# Patient Record
Sex: MALE | Race: WHITE | NOT HISPANIC OR LATINO | Employment: OTHER | URBAN - METROPOLITAN AREA
[De-identification: names, ages, dates, MRNs, and addresses within clinical notes are randomized per-mention and may not be internally consistent; named-entity substitution may affect disease eponyms.]

---

## 2024-05-03 ENCOUNTER — APPOINTMENT (OUTPATIENT)
Dept: RADIOLOGY | Facility: MEDICAL CENTER | Age: 69
End: 2024-05-03
Attending: STUDENT IN AN ORGANIZED HEALTH CARE EDUCATION/TRAINING PROGRAM
Payer: MEDICARE

## 2024-05-03 ENCOUNTER — HOSPITAL ENCOUNTER (OUTPATIENT)
Facility: MEDICAL CENTER | Age: 69
End: 2024-05-04
Attending: STUDENT IN AN ORGANIZED HEALTH CARE EDUCATION/TRAINING PROGRAM | Admitting: STUDENT IN AN ORGANIZED HEALTH CARE EDUCATION/TRAINING PROGRAM
Payer: MEDICARE

## 2024-05-03 DIAGNOSIS — R56.9 SEIZURES (HCC): ICD-10-CM

## 2024-05-03 DIAGNOSIS — R55 SYNCOPE, UNSPECIFIED SYNCOPE TYPE: ICD-10-CM

## 2024-05-03 PROBLEM — E78.1 HYPERTRIGLYCERIDEMIA: Status: ACTIVE | Noted: 2024-05-03

## 2024-05-03 PROBLEM — S01.91XA LACERATION OF HEAD: Status: ACTIVE | Noted: 2024-05-03

## 2024-05-03 PROBLEM — C44.91 BASAL CELL CARCINOMA (BCC): Status: ACTIVE | Noted: 2021-07-06

## 2024-05-03 PROBLEM — E03.9 HYPOTHYROIDISM: Status: ACTIVE | Noted: 2024-05-03

## 2024-05-03 LAB
ALBUMIN SERPL BCP-MCNC: 4.4 G/DL (ref 3.2–4.9)
ALBUMIN/GLOB SERPL: 1.8 G/DL
ALP SERPL-CCNC: 70 U/L (ref 30–99)
ALT SERPL-CCNC: 25 U/L (ref 2–50)
AMPHET UR QL SCN: NEGATIVE
ANION GAP SERPL CALC-SCNC: 13 MMOL/L (ref 7–16)
APPEARANCE UR: CLEAR
AST SERPL-CCNC: 31 U/L (ref 12–45)
BACTERIA #/AREA URNS HPF: NEGATIVE /HPF
BARBITURATES UR QL SCN: NEGATIVE
BASOPHILS # BLD AUTO: 0.3 % (ref 0–1.8)
BASOPHILS # BLD: 0.02 K/UL (ref 0–0.12)
BENZODIAZ UR QL SCN: NEGATIVE
BILIRUB SERPL-MCNC: 0.5 MG/DL (ref 0.1–1.5)
BILIRUB UR QL STRIP.AUTO: NEGATIVE
BUN SERPL-MCNC: 16 MG/DL (ref 8–22)
BZE UR QL SCN: NEGATIVE
CALCIUM ALBUM COR SERPL-MCNC: 8.6 MG/DL (ref 8.5–10.5)
CALCIUM SERPL-MCNC: 8.9 MG/DL (ref 8.5–10.5)
CANNABINOIDS UR QL SCN: NEGATIVE
CHLORIDE SERPL-SCNC: 100 MMOL/L (ref 96–112)
CO2 SERPL-SCNC: 23 MMOL/L (ref 20–33)
COLOR UR: YELLOW
CREAT SERPL-MCNC: 0.79 MG/DL (ref 0.5–1.4)
EKG IMPRESSION: NORMAL
EOSINOPHIL # BLD AUTO: 0.07 K/UL (ref 0–0.51)
EOSINOPHIL NFR BLD: 1.2 % (ref 0–6.9)
EPI CELLS #/AREA URNS HPF: NEGATIVE /HPF
ERYTHROCYTE [DISTWIDTH] IN BLOOD BY AUTOMATED COUNT: 39.5 FL (ref 35.9–50)
FENTANYL UR QL: NEGATIVE
GFR SERPLBLD CREATININE-BSD FMLA CKD-EPI: 96 ML/MIN/1.73 M 2
GLOBULIN SER CALC-MCNC: 2.5 G/DL (ref 1.9–3.5)
GLUCOSE SERPL-MCNC: 108 MG/DL (ref 65–99)
GLUCOSE UR STRIP.AUTO-MCNC: NEGATIVE MG/DL
HCT VFR BLD AUTO: 39.1 % (ref 42–52)
HGB BLD-MCNC: 13.8 G/DL (ref 14–18)
HYALINE CASTS #/AREA URNS LPF: ABNORMAL /LPF
IMM GRANULOCYTES # BLD AUTO: 0.03 K/UL (ref 0–0.11)
IMM GRANULOCYTES NFR BLD AUTO: 0.5 % (ref 0–0.9)
KETONES UR STRIP.AUTO-MCNC: 15 MG/DL
LEUKOCYTE ESTERASE UR QL STRIP.AUTO: NEGATIVE
LYMPHOCYTES # BLD AUTO: 0.92 K/UL (ref 1–4.8)
LYMPHOCYTES NFR BLD: 15.9 % (ref 22–41)
MAGNESIUM SERPL-MCNC: 1.9 MG/DL (ref 1.5–2.5)
MCH RBC QN AUTO: 30.7 PG (ref 27–33)
MCHC RBC AUTO-ENTMCNC: 35.3 G/DL (ref 32.3–36.5)
MCV RBC AUTO: 86.9 FL (ref 81.4–97.8)
METHADONE UR QL SCN: NEGATIVE
MICRO URNS: ABNORMAL
MONOCYTES # BLD AUTO: 0.48 K/UL (ref 0–0.85)
MONOCYTES NFR BLD AUTO: 8.3 % (ref 0–13.4)
NEUTROPHILS # BLD AUTO: 4.26 K/UL (ref 1.82–7.42)
NEUTROPHILS NFR BLD: 73.8 % (ref 44–72)
NITRITE UR QL STRIP.AUTO: NEGATIVE
NRBC # BLD AUTO: 0 K/UL
NRBC BLD-RTO: 0 /100 WBC (ref 0–0.2)
OPIATES UR QL SCN: NEGATIVE
OXYCODONE UR QL SCN: NEGATIVE
PCP UR QL SCN: NEGATIVE
PH UR STRIP.AUTO: 7 [PH] (ref 5–8)
PLATELET # BLD AUTO: 196 K/UL (ref 164–446)
PMV BLD AUTO: 9.7 FL (ref 9–12.9)
POTASSIUM SERPL-SCNC: 3.7 MMOL/L (ref 3.6–5.5)
PROPOXYPH UR QL SCN: NEGATIVE
PROT SERPL-MCNC: 6.9 G/DL (ref 6–8.2)
PROT UR QL STRIP: NEGATIVE MG/DL
RBC # BLD AUTO: 4.5 M/UL (ref 4.7–6.1)
RBC # URNS HPF: ABNORMAL /HPF
RBC UR QL AUTO: ABNORMAL
SODIUM SERPL-SCNC: 136 MMOL/L (ref 135–145)
SP GR UR STRIP.AUTO: 1.01
TROPONIN T SERPL-MCNC: 34 NG/L (ref 6–19)
TSH SERPL DL<=0.005 MIU/L-ACNC: 0.52 UIU/ML (ref 0.38–5.33)
UROBILINOGEN UR STRIP.AUTO-MCNC: 0.2 MG/DL
WBC # BLD AUTO: 5.8 K/UL (ref 4.8–10.8)
WBC #/AREA URNS HPF: ABNORMAL /HPF

## 2024-05-03 PROCEDURE — 93005 ELECTROCARDIOGRAM TRACING: CPT | Performed by: STUDENT IN AN ORGANIZED HEALTH CARE EDUCATION/TRAINING PROGRAM

## 2024-05-03 PROCEDURE — 99223 1ST HOSP IP/OBS HIGH 75: CPT | Performed by: STUDENT IN AN ORGANIZED HEALTH CARE EDUCATION/TRAINING PROGRAM

## 2024-05-03 PROCEDURE — 94660 CPAP INITIATION&MGMT: CPT

## 2024-05-03 PROCEDURE — 85025 COMPLETE CBC W/AUTO DIFF WBC: CPT

## 2024-05-03 PROCEDURE — 72125 CT NECK SPINE W/O DYE: CPT

## 2024-05-03 PROCEDURE — 83735 ASSAY OF MAGNESIUM: CPT

## 2024-05-03 PROCEDURE — 700102 HCHG RX REV CODE 250 W/ 637 OVERRIDE(OP)

## 2024-05-03 PROCEDURE — G0378 HOSPITAL OBSERVATION PER HR: HCPCS

## 2024-05-03 PROCEDURE — 303747 HCHG EXTRA SUTURE

## 2024-05-03 PROCEDURE — 84484 ASSAY OF TROPONIN QUANT: CPT

## 2024-05-03 PROCEDURE — 70486 CT MAXILLOFACIAL W/O DYE: CPT

## 2024-05-03 PROCEDURE — A9270 NON-COVERED ITEM OR SERVICE: HCPCS

## 2024-05-03 PROCEDURE — 81001 URINALYSIS AUTO W/SCOPE: CPT | Mod: XU

## 2024-05-03 PROCEDURE — 80053 COMPREHEN METABOLIC PANEL: CPT

## 2024-05-03 PROCEDURE — 84443 ASSAY THYROID STIM HORMONE: CPT

## 2024-05-03 PROCEDURE — 70450 CT HEAD/BRAIN W/O DYE: CPT

## 2024-05-03 PROCEDURE — 700101 HCHG RX REV CODE 250: Performed by: STUDENT IN AN ORGANIZED HEALTH CARE EDUCATION/TRAINING PROGRAM

## 2024-05-03 PROCEDURE — 304999 HCHG REPAIR-SIMPLE/INTERMED LEVEL 1

## 2024-05-03 PROCEDURE — 99285 EMERGENCY DEPT VISIT HI MDM: CPT

## 2024-05-03 PROCEDURE — 36415 COLL VENOUS BLD VENIPUNCTURE: CPT

## 2024-05-03 PROCEDURE — 80307 DRUG TEST PRSMV CHEM ANLYZR: CPT

## 2024-05-03 RX ORDER — PAROXETINE 20 MG/1
20 TABLET, FILM COATED ORAL DAILY
Status: DISCONTINUED | OUTPATIENT
Start: 2024-05-04 | End: 2024-05-04 | Stop reason: HOSPADM

## 2024-05-03 RX ORDER — ATORVASTATIN CALCIUM 20 MG/1
20 TABLET, FILM COATED ORAL DAILY
Status: DISCONTINUED | OUTPATIENT
Start: 2024-05-04 | End: 2024-05-04 | Stop reason: HOSPADM

## 2024-05-03 RX ORDER — ACETAMINOPHEN 325 MG/1
650 TABLET ORAL EVERY 6 HOURS PRN
Status: DISCONTINUED | OUTPATIENT
Start: 2024-05-03 | End: 2024-05-03

## 2024-05-03 RX ORDER — LEVOTHYROXINE SODIUM 100 UG/1
100 TABLET ORAL
COMMUNITY
Start: 2023-09-08 | End: 2024-09-02

## 2024-05-03 RX ORDER — PAROXETINE 40 MG/1
20 TABLET, FILM COATED ORAL DAILY
COMMUNITY

## 2024-05-03 RX ORDER — ASPIRIN 81 MG/1
81 TABLET ORAL DAILY
COMMUNITY
Start: 2024-03-12

## 2024-05-03 RX ORDER — ATORVASTATIN CALCIUM 20 MG/1
20 TABLET, FILM COATED ORAL DAILY
COMMUNITY
Start: 2023-09-08 | End: 2024-09-02

## 2024-05-03 RX ORDER — ACETAMINOPHEN 500 MG
1000 TABLET ORAL EVERY 6 HOURS PRN
Status: DISCONTINUED | OUTPATIENT
Start: 2024-05-03 | End: 2024-05-04 | Stop reason: HOSPADM

## 2024-05-03 RX ORDER — MULTIVIT WITH MINERALS/LUTEIN
1000 TABLET ORAL DAILY
COMMUNITY

## 2024-05-03 RX ORDER — LEVOTHYROXINE SODIUM 50 UG/1
100 TABLET ORAL
Status: DISCONTINUED | OUTPATIENT
Start: 2024-05-04 | End: 2024-05-04 | Stop reason: HOSPADM

## 2024-05-03 RX ORDER — CETIRIZINE HYDROCHLORIDE 10 MG/1
10 TABLET ORAL DAILY
COMMUNITY

## 2024-05-03 RX ORDER — IBUPROFEN 200 MG
400 TABLET ORAL
COMMUNITY

## 2024-05-03 RX ORDER — LIDOCAINE HYDROCHLORIDE AND EPINEPHRINE 10; 10 MG/ML; UG/ML
20 INJECTION, SOLUTION INFILTRATION; PERINEURAL ONCE
Status: COMPLETED | OUTPATIENT
Start: 2024-05-03 | End: 2024-05-03

## 2024-05-03 RX ADMIN — ACETAMINOPHEN 1000 MG: 500 TABLET, FILM COATED ORAL at 22:18

## 2024-05-03 RX ADMIN — LIDOCAINE HYDROCHLORIDE AND EPINEPHRINE 20 ML: 10; 10 INJECTION, SOLUTION INFILTRATION; PERINEURAL at 19:45

## 2024-05-03 ASSESSMENT — LIFESTYLE VARIABLES
EVER FELT BAD OR GUILTY ABOUT YOUR DRINKING: NO
TOTAL SCORE: 0
ALCOHOL_USE: NO
TOTAL SCORE: 0
TOTAL SCORE: 0
HOW MANY TIMES IN THE PAST YEAR HAVE YOU HAD 5 OR MORE DRINKS IN A DAY: 0
HAVE YOU EVER FELT YOU SHOULD CUT DOWN ON YOUR DRINKING: NO
DOES PATIENT WANT TO STOP DRINKING: NO
CONSUMPTION TOTAL: NEGATIVE
HAVE PEOPLE ANNOYED YOU BY CRITICIZING YOUR DRINKING: NO
EVER HAD A DRINK FIRST THING IN THE MORNING TO STEADY YOUR NERVES TO GET RID OF A HANGOVER: NO
ON A TYPICAL DAY WHEN YOU DRINK ALCOHOL HOW MANY DRINKS DO YOU HAVE: 0
AVERAGE NUMBER OF DAYS PER WEEK YOU HAVE A DRINK CONTAINING ALCOHOL: 0

## 2024-05-03 ASSESSMENT — PATIENT HEALTH QUESTIONNAIRE - PHQ9
SUM OF ALL RESPONSES TO PHQ9 QUESTIONS 1 AND 2: 0
2. FEELING DOWN, DEPRESSED, IRRITABLE, OR HOPELESS: NOT AT ALL
1. LITTLE INTEREST OR PLEASURE IN DOING THINGS: NOT AT ALL

## 2024-05-03 ASSESSMENT — ENCOUNTER SYMPTOMS
DIARRHEA: 0
FALLS: 1
HEADACHES: 1
DIZZINESS: 0
TREMORS: 0
DEPRESSION: 0
MYALGIAS: 0
BLOOD IN STOOL: 0
LOSS OF CONSCIOUSNESS: 1
NAUSEA: 0
SENSORY CHANGE: 0
SEIZURES: 0
FOCAL WEAKNESS: 0
BLURRED VISION: 0
NERVOUS/ANXIOUS: 0
PALPITATIONS: 0
SHORTNESS OF BREATH: 0
WEIGHT LOSS: 0
DIAPHORESIS: 0
WEAKNESS: 0
TINGLING: 0
DOUBLE VISION: 0
SPEECH CHANGE: 0
FEVER: 0

## 2024-05-03 ASSESSMENT — FIBROSIS 4 INDEX: FIB4 SCORE: 2.18

## 2024-05-03 ASSESSMENT — PAIN DESCRIPTION - PAIN TYPE
TYPE: ACUTE PAIN
TYPE: ACUTE PAIN

## 2024-05-04 ENCOUNTER — APPOINTMENT (OUTPATIENT)
Dept: CARDIOLOGY | Facility: MEDICAL CENTER | Age: 69
End: 2024-05-04
Attending: STUDENT IN AN ORGANIZED HEALTH CARE EDUCATION/TRAINING PROGRAM
Payer: MEDICARE

## 2024-05-04 ENCOUNTER — PHARMACY VISIT (OUTPATIENT)
Dept: PHARMACY | Facility: MEDICAL CENTER | Age: 69
End: 2024-05-04
Payer: COMMERCIAL

## 2024-05-04 VITALS
SYSTOLIC BLOOD PRESSURE: 146 MMHG | OXYGEN SATURATION: 94 % | TEMPERATURE: 99.1 F | DIASTOLIC BLOOD PRESSURE: 79 MMHG | WEIGHT: 180.12 LBS | HEIGHT: 70 IN | HEART RATE: 70 BPM | BODY MASS INDEX: 25.79 KG/M2 | RESPIRATION RATE: 16 BRPM

## 2024-05-04 PROBLEM — R79.89 ELEVATED TROPONIN: Status: ACTIVE | Noted: 2024-05-04

## 2024-05-04 LAB
ANION GAP SERPL CALC-SCNC: 11 MMOL/L (ref 7–16)
BUN SERPL-MCNC: 14 MG/DL (ref 8–22)
CALCIUM SERPL-MCNC: 8.8 MG/DL (ref 8.5–10.5)
CHLORIDE SERPL-SCNC: 98 MMOL/L (ref 96–112)
CO2 SERPL-SCNC: 24 MMOL/L (ref 20–33)
CREAT SERPL-MCNC: 0.73 MG/DL (ref 0.5–1.4)
EXTRA TUBE BLU BLU: NORMAL
GFR SERPLBLD CREATININE-BSD FMLA CKD-EPI: 98 ML/MIN/1.73 M 2
GLUCOSE SERPL-MCNC: 146 MG/DL (ref 65–99)
LV EJECT FRACT  99904: 63
LV EJECT FRACT MOD 2C 99903: 69.11
LV EJECT FRACT MOD 4C 99902: 63.04
MAGNESIUM SERPL-MCNC: 2.1 MG/DL (ref 1.5–2.5)
POTASSIUM SERPL-SCNC: 3.3 MMOL/L (ref 3.6–5.5)
SODIUM SERPL-SCNC: 133 MMOL/L (ref 135–145)
TROPONIN T SERPL-MCNC: 52 NG/L (ref 6–19)
TROPONIN T SERPL-MCNC: 86 NG/L (ref 6–19)

## 2024-05-04 PROCEDURE — 80048 BASIC METABOLIC PNL TOTAL CA: CPT

## 2024-05-04 PROCEDURE — 99239 HOSP IP/OBS DSCHRG MGMT >30: CPT | Performed by: STUDENT IN AN ORGANIZED HEALTH CARE EDUCATION/TRAINING PROGRAM

## 2024-05-04 PROCEDURE — 94660 CPAP INITIATION&MGMT: CPT

## 2024-05-04 PROCEDURE — G0378 HOSPITAL OBSERVATION PER HR: HCPCS

## 2024-05-04 PROCEDURE — 700105 HCHG RX REV CODE 258: Performed by: STUDENT IN AN ORGANIZED HEALTH CARE EDUCATION/TRAINING PROGRAM

## 2024-05-04 PROCEDURE — 95819 EEG AWAKE AND ASLEEP: CPT | Performed by: PSYCHIATRY & NEUROLOGY

## 2024-05-04 PROCEDURE — 96374 THER/PROPH/DIAG INJ IV PUSH: CPT | Mod: XU

## 2024-05-04 PROCEDURE — 95819 EEG AWAKE AND ASLEEP: CPT | Mod: 26 | Performed by: PSYCHIATRY & NEUROLOGY

## 2024-05-04 PROCEDURE — 36415 COLL VENOUS BLD VENIPUNCTURE: CPT

## 2024-05-04 PROCEDURE — 93306 TTE W/DOPPLER COMPLETE: CPT

## 2024-05-04 PROCEDURE — 93306 TTE W/DOPPLER COMPLETE: CPT | Mod: 26 | Performed by: INTERNAL MEDICINE

## 2024-05-04 PROCEDURE — A9270 NON-COVERED ITEM OR SERVICE: HCPCS

## 2024-05-04 PROCEDURE — 83735 ASSAY OF MAGNESIUM: CPT

## 2024-05-04 PROCEDURE — 700102 HCHG RX REV CODE 250 W/ 637 OVERRIDE(OP)

## 2024-05-04 PROCEDURE — 700111 HCHG RX REV CODE 636 W/ 250 OVERRIDE (IP): Mod: JZ | Performed by: STUDENT IN AN ORGANIZED HEALTH CARE EDUCATION/TRAINING PROGRAM

## 2024-05-04 PROCEDURE — A9270 NON-COVERED ITEM OR SERVICE: HCPCS | Mod: JZ | Performed by: STUDENT IN AN ORGANIZED HEALTH CARE EDUCATION/TRAINING PROGRAM

## 2024-05-04 PROCEDURE — 700102 HCHG RX REV CODE 250 W/ 637 OVERRIDE(OP): Mod: JZ | Performed by: STUDENT IN AN ORGANIZED HEALTH CARE EDUCATION/TRAINING PROGRAM

## 2024-05-04 PROCEDURE — 99222 1ST HOSP IP/OBS MODERATE 55: CPT | Mod: 25 | Performed by: PSYCHIATRY & NEUROLOGY

## 2024-05-04 PROCEDURE — RXMED WILLOW AMBULATORY MEDICATION CHARGE: Performed by: STUDENT IN AN ORGANIZED HEALTH CARE EDUCATION/TRAINING PROGRAM

## 2024-05-04 PROCEDURE — 84484 ASSAY OF TROPONIN QUANT: CPT

## 2024-05-04 RX ORDER — LEVETIRACETAM 500 MG/5ML
1500 INJECTION, SOLUTION, CONCENTRATE INTRAVENOUS ONCE
Status: COMPLETED | OUTPATIENT
Start: 2024-05-04 | End: 2024-05-04

## 2024-05-04 RX ORDER — SODIUM CHLORIDE 9 MG/ML
INJECTION, SOLUTION INTRAVENOUS CONTINUOUS
Status: ACTIVE | OUTPATIENT
Start: 2024-05-04 | End: 2024-05-04

## 2024-05-04 RX ORDER — POTASSIUM CHLORIDE 1500 MG/1
40 TABLET, EXTENDED RELEASE ORAL ONCE
Status: COMPLETED | OUTPATIENT
Start: 2024-05-04 | End: 2024-05-04

## 2024-05-04 RX ORDER — LEVETIRACETAM 500 MG/1
TABLET ORAL
Qty: 100 TABLET | Refills: 0 | Status: SHIPPED | OUTPATIENT
Start: 2024-05-04 | End: 2024-06-08

## 2024-05-04 RX ADMIN — POTASSIUM CHLORIDE 40 MEQ: 1500 TABLET, EXTENDED RELEASE ORAL at 08:40

## 2024-05-04 RX ADMIN — POTASSIUM CHLORIDE 40 MEQ: 1500 TABLET, EXTENDED RELEASE ORAL at 03:02

## 2024-05-04 RX ADMIN — LEVOTHYROXINE SODIUM 100 MCG: 0.05 TABLET ORAL at 05:27

## 2024-05-04 RX ADMIN — PAROXETINE HYDROCHLORIDE 20 MG: 20 TABLET, FILM COATED ORAL at 05:27

## 2024-05-04 RX ADMIN — ACETAMINOPHEN 1000 MG: 500 TABLET, FILM COATED ORAL at 04:23

## 2024-05-04 RX ADMIN — SODIUM CHLORIDE: 9 INJECTION, SOLUTION INTRAVENOUS at 09:43

## 2024-05-04 RX ADMIN — LEVETIRACETAM 1500 MG: 100 INJECTION, SOLUTION, CONCENTRATE INTRAVENOUS at 16:18

## 2024-05-04 RX ADMIN — ATORVASTATIN CALCIUM 20 MG: 20 TABLET, FILM COATED ORAL at 05:27

## 2024-05-04 RX ADMIN — ACETAMINOPHEN 1000 MG: 500 TABLET, FILM COATED ORAL at 14:37

## 2024-05-04 ASSESSMENT — ENCOUNTER SYMPTOMS
HEADACHES: 1
SPEECH CHANGE: 0
LOSS OF CONSCIOUSNESS: 1
DIARRHEA: 0
NAUSEA: 0
DIZZINESS: 0
CHILLS: 0
TREMORS: 0
SENSORY CHANGE: 0
FOCAL WEAKNESS: 0
ABDOMINAL PAIN: 0
FEVER: 0
VOMITING: 0
PALPITATIONS: 0
CONSTIPATION: 0
SHORTNESS OF BREATH: 0
SEIZURES: 1

## 2024-05-04 ASSESSMENT — PAIN DESCRIPTION - PAIN TYPE
TYPE: ACUTE PAIN

## 2024-05-04 NOTE — ED NOTES
Med rec completed per patient at bedside, and electronic medical records from [facility].    Allergies reviewed with patient. NKDA.    Outpatient antibiotics within the last 30 days: NONE.    ANTICOAGULATION: NONE. Patient did previously take antiplatelet medication, aspirin 81 mg x 1 tablet daily, however he states that this was discontinued about 3 weeks ago.    Patient's preferred pharmacy: [facility].

## 2024-05-04 NOTE — CONSULTS
Neurology Initial Consult H&P  Neurohospitalist Service, [facility]    Referring Physician: Obdulio Hooks M.D.    Chief Complaint   Patient presents with    Seizure     BIB EMS from [facility]. Pt had a witness seizure lasting approx 1 min. Pt arrived with hematoma to L eye and lac to L forehead. Dressing in place and bleeding controlled.    No prior sz hx. -loc -thinners        HPI: Tyree Medellin is a 68 y.o. man with a hx of PATRICIA and hypothyroidism that presents after a spell of LOC/convulsion, witnessed by his brother. He reportedly suddenly stopped talking and looked up and pointed while turning to the right. Then he lost postural tone and fell to the floor and had generalized convulsion. He did bite his tongue. No loss of b/b control. He returned to baseline mental status over 1-2 hours. No memory of the event or for nearly 30 minutes.     He had another spell staring spell while sitting in a swivel chair in which he turned continuously to the right for several minutes. This time he did not fall or have a convulsion. He was confused and unable to establish new memories for 3-4 hours. MRI brain wo contrast reportedly normal at that time. He completed an outpatient EEG which he was told was normal.     Review of systems: In addition to what is detailed in the HPI above, (and scanned into the chart if and when applicable), all other systems reviewed and are negative.    Past Medical History:    has no past medical history on file.    FHx:  family history is not on file.    SHx:   reports that he has never smoked. He has never used smokeless tobacco. He reports that he does not currently use alcohol. He reports that he does not use drugs.    Allergies:  No Known Allergies    Medications:    Current Facility-Administered Medications:     atorvastatin (Lipitor) tablet 20 mg, 20 mg, Oral, DAILY, Frantz Saavedra M.D., 20 mg at 05/04/24 0598    levothyroxine (Synthroid) tablet 100 mcg, 100  "mcg, Oral, AM ES, Frantz Saavedra M.D., 100 mcg at 05/04/24 0527    PARoxetine (Paxil) tablet 20 mg, 20 mg, Oral, DAILY, Frantz Saavedra M.D., 20 mg at 05/04/24 0527    acetaminophen (Tylenol) tablet 1,000 mg, 1,000 mg, Oral, Q6HRS PRN, Frantz Saavedra M.D., 1,000 mg at 05/04/24 1437    Physical Examination:     Vitals:    05/04/24 0737 05/04/24 0738 05/04/24 1152 05/04/24 1549   BP: 125/77 125/81 (!) 152/80 (!) 146/79   Pulse: 62 80 60 70   Resp:   16 16   Temp:   37 °C (98.6 °F) 37.3 °C (99.1 °F)   TempSrc:   Temporal Temporal   SpO2:   97% 94%   Weight:       Height:           General: Patient is awake and in no acute distress  Eyes: examination of optic disks not indicated at this time  CV: RRR    NEUROLOGICAL EXAM:     Mental status: Awake, alert and fully oriented, follows commands  Speech and language: speech is clear and fluent. The patient is able to name and repeat.  Cranial nerve exam: Pupils are equal, round and reactive to light bilaterally. Visual fields are full. Extraocular muscles are intact. Sensation in the face is intact to light touch. Face is symmetric. Hearing to finger rub equal. Palate elevates symmetrically. Shoulder shrug is full. Tongue is midline.  Motor exam: Strength is 5/5 in all extremities both distally and proximally. Tone is normal. No abnormal movements were seen on exam.  Sensory exam: No sensory deficits identified   Deep tendon reflexes:  2+ and symmetric. Toes down-going bilaterally.  Coordination: no ataxia   Gait: deferred     Objective Data:    Labs:  No results found for: \"PROTHROMBTM\", \"INR\"   Lab Results   Component Value Date/Time    WBC 5.8 05/03/2024 05:48 PM    RBC 4.50 (L) 05/03/2024 05:48 PM    HEMOGLOBIN 13.8 (L) 05/03/2024 05:48 PM    HEMATOCRIT 39.1 (L) 05/03/2024 05:48 PM    MCV 86.9 05/03/2024 05:48 PM    MCH 30.7 05/03/2024 05:48 PM    MCHC 35.3 05/03/2024 05:48 PM    MPV 9.7 05/03/2024 05:48 PM    NEUTSPOLYS 73.80 (H) 05/03/2024 05:48 PM    " "LYMPHOCYTES 15.90 (L) 05/03/2024 05:48 PM    MONOCYTES 8.30 05/03/2024 05:48 PM    EOSINOPHILS 1.20 05/03/2024 05:48 PM    BASOPHILS 0.30 05/03/2024 05:48 PM      Lab Results   Component Value Date/Time    SODIUM 133 (L) 05/04/2024 12:47 AM    POTASSIUM 3.3 (L) 05/04/2024 12:47 AM    CHLORIDE 98 05/04/2024 12:47 AM    CO2 24 05/04/2024 12:47 AM    GLUCOSE 146 (H) 05/04/2024 12:47 AM    BUN 14 05/04/2024 12:47 AM    CREATININE 0.73 05/04/2024 12:47 AM      No results found for: \"CHOLSTRLTOT\", \"LDL\", \"HDL\", \"TRIGLYCERIDE\"    Lab Results   Component Value Date/Time    ALKPHOSPHAT 70 05/03/2024 05:48 PM    ASTSGOT 31 05/03/2024 05:48 PM    ALTSGPT 25 05/03/2024 05:48 PM    TBILIRUBIN 0.5 05/03/2024 05:48 PM        Imaging/Testing:  EEG  This was an abnormal video EEG recording in the awake, drowsy, and sleep state(s):  The presence of rare to occasional, left temporal, sharp discharges, is suggestive of increased propensity toward focal seizures arising from that region.  There were no electrographic seizures captured.    Assessment and Plan:    New onset seizure - in retrospect, he likely had a seizure 2 months ago as well. MRI without contrast is reportedly normal. EEG with rare L temporal discharges as above. Recommend starting keppra. He will also need outpatient neuro follow up and MRI brain w contrast.     Plan:  -Keppra 1500 mg iv x 1 now, then 500 mg daily x 5 days, then 750  -EEG as above  -outpatient MRI brain w contrast  -No driving, operating heavy machinery, swimming alone, climbing ladders, or engaging in any activity that could increase the risk of injury or death in the event of a seizure   -will sign off; please call with questions.     Sukumar Rojas MD  Board Certified Neurology, ABPN    "

## 2024-05-04 NOTE — ASSESSMENT & PLAN NOTE
Troponin: 34.   EKG w/o changes suggestive of ischemia.   No ACS type symptoms.   - Repeat troponin.

## 2024-05-04 NOTE — PROGRESS NOTES
4 Eyes Skin Assessment Completed by ANA Cohen and ANA Ly.    Head Laceration with sutures, Bruising, Swelling, and Redness  Ears WDL  Nose WDL  Mouth WDL  Neck WDL  Breast/Chest WDL  Shoulder Blades WDL  Spine WDL  (R) Arm/Elbow/Hand WDL  (L) Arm/Elbow/Hand WDL  Abdomen WDL  Groin WDL  Scrotum/Coccyx/Buttocks WDL  (R) Leg Incision  (L) Leg WDL  (R) Heel/Foot/Toe WDL  (L) Heel/Foot/Toe WDL          Devices In Places Tele Box, Blood Pressure Cuff, and Pulse Ox      Interventions In Place Pillows    Possible Skin Injury No    Pictures Uploaded Into Epic N/A  Wound Consult Placed N/A  RN Wound Prevention Protocol Ordered No

## 2024-05-04 NOTE — ED NOTES
Pt transported off unit on portable monitor with Telemetry RN. Pt awake and breathing with even, unlabored breaths on RA. All belongings with Pt.

## 2024-05-04 NOTE — PROCEDURES
Critical access hospital    Inpatient Standard Video Electroencephalogram Report      Patient Name: Tyree Medellin  MRN: [id number]  #: [id number]  Date of Service: 05/04/24  Total Recording Time: 0 hours and 25 minutes.  Referring Provider: Obdulio Hooks M.D.    INDICATION:  Tyree Medellin 68 y.o. male. This standard, inpatient, EEG was requested to evaluate for seizure(s).    CURRENT ANTI-SEIZURE AND OTHER PERTINENT MEDICATIONS:     Current Facility-Administered Medications:     atorvastatin    levothyroxine    PARoxetine    acetaminophen    TECHNIQUE: Standard inpatient video EEG was set up by a Neurodiagnostic technologist who performed education to the patient and staff. A minimum of 23 electrodes and 23 channel recording was setup and performed by Neurodiagnostic technologist, in accordance with the international 10-20 system. Impedence, electrode integrity, and technical impressions were documented. The study was reviewed in bipolar and referential montages. The recording examined the patient in the awake, drowsy, and sleep state(s).     DESCRIPTION OF THE RECORD:  EEG background: During maximal wakefulness, the background was continuous, symmetrical, and 9-9.5 Hz posterior dominant rhythm.  Reactivity and state changes were present.  During drowsiness, a loss of myogenic artifact and theta/delta frequencies were seen.     Occasional N2 sleep transients in the form of rudimentary and/or ill-defined sleep spindles fragments and vertex waves were seen in the leads over the central regions.     ACTIVATION PROCEDURES:   Intermittent Photic stimulation was performed in a stepwise fashion from 1 to 30 Hz and did not elicit any additional abnormalities on EEG.     ICTAL AND INTERICTAL FINDINGS:   There were rare to occasional, low to medium amplitude (~50 µV), left temporal sharp discharges.         No persistent regional slowing was seen during this routine study.      No electroclinical or electrographic seizures were reported or  recorded during the study.     EKG: Sampling of the EKG recording did not demonstrate any abnormalities    EVENTS:  No clinical events recorded or reported    INTERPRETATION:  This was an abnormal video EEG recording in the awake, drowsy, and sleep state(s):  The presence of rare to occasional, left temporal, sharp discharges, is suggestive of increased propensity toward focal seizures arising from that region.  There were no electrographic seizures captured.    Michael Hager MD  Neurology Attending, Epilepsy Program  [facility]

## 2024-05-04 NOTE — ASSESSMENT & PLAN NOTE
"Sudden loss consciousness, fell forward hitting his head at the edge of the bed when attempting to get up from a sitting position; no recollection, told by brother.  Woke up in the ambulance; prior recollection is that he had told his brother \"lets go eat something\" and beginning the motion to get up from a chair. Had 3 energizing drinks in AM (does this every morning).   Denies prior episodes of syncope.  Denies his brother telling him that he lost urine or stool.  Unclear if his brother saw any involuntary movements, per ERP reportedly his brother did see this.    Refers recently being seen for transient global amnesia (April); he had extensive workup w/o any specific etiologies identified, EEG that was done within the last 48 hours was normal per patient.   CT head: No intracranial abnormalities, evident left frontoparietal subcutaneous hematoma.  Maxillofacial and cervical spine CT without acute findings concerning for bone fractures.  TSH: 0.520.  No electrolyte abnormalities on CMP.  Mildly elevated troponin T.  EKG: Sinus rhythm, no axis deviation, no evident abnormalities in conduction, no abnormalities consistent with ischemia/MI.  Unclear etiology at this time. Possibly hypoxia d/t not using CPAP overnight and being at altitude (leaves in [facility]) +/- Arrhythmia +/- Seizure (no prior history).   - Obtain records from [place] regarding recent workup for transient global amnesia.   - cEEG.   - Telemetry monitoring.   - Maintain euvolemia.   - O2 support w/CPAP overnight.   - Precautions: seizures, falls, aspiration.  - Consider consultation to Neurology if pertinent.   "

## 2024-05-04 NOTE — ED PROVIDER NOTES
ED Provider Note    CHIEF COMPLAINT  Chief Complaint   Patient presents with    Seizure     BIB EMS from [facility]. Pt had a witness seizure lasting approx 1 min. Pt arrived with hematoma to L eye and lac to L forehead. Dressing in place and bleeding controlled.    No prior sz hx. -loc -thinners        EXTERNAL RECORDS REVIEWED  Neurology note 4/9/2024 [facility], diagnosed with transient global ischemia.  Normal MRI     HPI/ROS  LIMITATION TO HISTORY   Select: : None  OUTSIDE HISTORIAN(S):  none    Tyree Medellin is a 68 y.o. male with past medical history of anxiety on Paxil, hypothyroidism on levothyroxine presenting to the emergency department for seizure-like activity.  Patient says that he has been traveling with his brother to [place] from [facility].  Today I had a period of seizure-like activity precipitating a fall onto a bed.  Patient struck his forehead and left eye on an unknown object.  Was brought by EMS for evaluation.    Patient was recently seen in the emergency department up in [place], ultimately diagnosed with transient global amnesia after he underwent CT, MRI, EEG of the brain approximately 1 week ago.  He was seen by neurology outpatient.  EEG reportedly did not show seizure-like activity and he was not started on antiepileptic medication.    His other medications have been relatively stable for the last 20 years he denies any recent medication changes.  Otherwise has been in his normal state of health.  Denies any preceding chest pain or shortness of breath.  No headache or neck pain.    His primary complaint today is a stabbing sensation just inferior to his left eye.  He does not have any visual deficits or diplopia.  No neck pain, chest pain, back pain, abdominal pain, hip pain.    PAST MEDICAL HISTORY       SURGICAL HISTORY  patient denies any surgical history    FAMILY HISTORY  History reviewed. No pertinent family history.    SOCIAL HISTORY  Social History  "    Tobacco Use    Smoking status: Never    Smokeless tobacco: Never   Substance and Sexual Activity    Alcohol use: Not Currently    Drug use: Never    Sexual activity: Not on file       CURRENT MEDICATIONS  Home Medications       Reviewed by Rosas Avendaño (Pharmacy Tech) on 05/03/24 at 1957  Med List Status: Complete     Medication Last Dose Status   asa/apap/caffeine (EXCEDRIN) 250-250-65 MG Tab 5/2/2024 Active   Ascorbic Acid (VITAMIN C) 1000 MG Tab 5/3/2024 Active   aspirin 81 MG EC tablet ABOUT 3 WEEKS AGO Active   atorvastatin (LIPITOR) 20 MG Tab 5/3/2024 Active   cetirizine (ZYRTEC) 10 MG Tab 5/3/2024 Active   Cholecalciferol (VITAMIN D3 PO) 5/3/2024 Active   Cyanocobalamin (VITAMIN B-12 PO) 5/3/2024 Active   ibuprofen (MOTRIN) 200 MG Tab 5/3/2024 Active   levothyroxine (SYNTHROID) 100 MCG Tab 5/3/2024 Active   multivitamin Tab 5/3/2024 Active   paroxetine (PAXIL) 40 MG tablet 5/3/2024 Active                    ALLERGIES  Not on File    PHYSICAL EXAM  VITAL SIGNS: BP (!) 143/82   Pulse 88   Temp 37.1 °C (98.8 °F) (Temporal)   Resp 20   Ht 1.778 m (5' 10\")   Wt 81.7 kg (180 lb 1.9 oz)   SpO2 91%   BMI 25.84 kg/m²    General:  non-toxic, no acute distress  Neuro:   - Alert and oriented  - CN 2-12 grossly intact  - Upper extremities         - Normal strength         - Sensation intact to light touch  - Lower extremities         - Normal strength         - Sensation intact to light touch  - No truncal ataxia  Neck: No midline cervical spine tenderness  HEENT:   - Head: 2 cm laceration left forehead, abrasion and contusion to the bridge of the nose and left cheek  - Eyes: PERRL  - Ears/Nose: normal external nose and ears  - Mouth: moist mucosal membranes  Resp: clear to auscultation, no increased work of breathing  CV: Regular rate and rhythm  Abd: Soft, non-tender, non-distended  Extremities: No peripheral edema  Psych: lucid and conversational         DIAGNOSTIC STUDIES / PROCEDURES    EKG  My " independent EKG interpretation:  Results for orders placed or performed during the hospital encounter of 24   EKG (NOW)   Result Value Ref Range    Report       [facility] Emergency Dept.    Test Date:  2024  Pt Name:    LIZETH FERNANDES                Department: ER  MRN:        [id number]                      Room:        13  Gender:     Male                         Technician: [id number]  :        1955                   Requested By:TAWNY PACHECO  Order #:    [id number]                    Reading MD:    Measurements  Intervals                                Axis  Rate:       87                           P:          47  TX:         171                          QRS:        11  QRSD:       89                           T:          34  QT:         360  QTc:        433    Interpretive Statements  Sinus rhythm  Baseline wander in lead(s) V6  No previous ECG available for comparison         LABS  Results for orders placed or performed during the hospital encounter of 24   CBC WITH DIFFERENTIAL   Result Value Ref Range    WBC 5.8 4.8 - 10.8 K/uL    RBC 4.50 (L) 4.70 - 6.10 M/uL    Hemoglobin 13.8 (L) 14.0 - 18.0 g/dL    Hematocrit 39.1 (L) 42.0 - 52.0 %    MCV 86.9 81.4 - 97.8 fL    MCH 30.7 27.0 - 33.0 pg    MCHC 35.3 32.3 - 36.5 g/dL    RDW 39.5 35.9 - 50.0 fL    Platelet Count 196 164 - 446 K/uL    MPV 9.7 9.0 - 12.9 fL    Neutrophils-Polys 73.80 (H) 44.00 - 72.00 %    Lymphocytes 15.90 (L) 22.00 - 41.00 %    Monocytes 8.30 0.00 - 13.40 %    Eosinophils 1.20 0.00 - 6.90 %    Basophils 0.30 0.00 - 1.80 %    Immature Granulocytes 0.50 0.00 - 0.90 %    Nucleated RBC 0.00 0.00 - 0.20 /100 WBC    Neutrophils (Absolute) 4.26 1.82 - 7.42 K/uL    Lymphs (Absolute) 0.92 (L) 1.00 - 4.80 K/uL    Monos (Absolute) 0.48 0.00 - 0.85 K/uL    Eos (Absolute) 0.07 0.00 - 0.51 K/uL    Baso (Absolute) 0.02 0.00 - 0.12 K/uL    Immature Granulocytes (abs) 0.03 0.00 - 0.11 K/uL    NRBC (Absolute) 0.00  K/uL   COMP METABOLIC PANEL   Result Value Ref Range    Sodium 136 135 - 145 mmol/L    Potassium 3.7 3.6 - 5.5 mmol/L    Chloride 100 96 - 112 mmol/L    Co2 23 20 - 33 mmol/L    Anion Gap 13.0 7.0 - 16.0    Glucose 108 (H) 65 - 99 mg/dL    Bun 16 8 - 22 mg/dL    Creatinine 0.79 0.50 - 1.40 mg/dL    Calcium 8.9 8.5 - 10.5 mg/dL    Correct Calcium 8.6 8.5 - 10.5 mg/dL    AST(SGOT) 31 12 - 45 U/L    ALT(SGPT) 25 2 - 50 U/L    Alkaline Phosphatase 70 30 - 99 U/L    Total Bilirubin 0.5 0.1 - 1.5 mg/dL    Albumin 4.4 3.2 - 4.9 g/dL    Total Protein 6.9 6.0 - 8.2 g/dL    Globulin 2.5 1.9 - 3.5 g/dL    A-G Ratio 1.8 g/dL   TROPONIN   Result Value Ref Range    Troponin T 34 (H) 6 - 19 ng/L   URINALYSIS (UA)    Specimen: Urine   Result Value Ref Range    Color Yellow     Character Clear     Specific Gravity 1.011 <1.035    Ph 7.0 5.0 - 8.0    Glucose Negative Negative mg/dL    Ketones 15 (A) Negative mg/dL    Protein Negative Negative mg/dL    Bilirubin Negative Negative    Urobilinogen, Urine 0.2 Negative    Nitrite Negative Negative    Leukocyte Esterase Negative Negative    Occult Blood Trace (A) Negative    Micro Urine Req Microscopic    URINE DRUG SCREEN   Result Value Ref Range    Amphetamines Urine Negative Negative    Barbiturates Negative Negative    Benzodiazepines Negative Negative    Cocaine Metabolite Negative Negative    Fentanyl, Urine Negative Negative    Methadone Negative Negative    Opiates Negative Negative    Oxycodone Negative Negative    Phencyclidine -Pcp Negative Negative    Propoxyphene Negative Negative    Cannabinoid Metab Negative Negative   MAGNESIUM   Result Value Ref Range    Magnesium 1.9 1.5 - 2.5 mg/dL   TSH WITH REFLEX TO FT4   Result Value Ref Range    TSH 0.520 0.380 - 5.330 uIU/mL   ESTIMATED GFR   Result Value Ref Range    GFR (CKD-EPI) 96 >60 mL/min/1.73 m 2   URINE MICROSCOPIC (W/UA)   Result Value Ref Range    WBC 0-2 (A) /hpf    RBC 0-2 (A) /hpf    Bacteria Negative None /hpf     Epithelial Cells Negative /hpf    Hyaline Cast 0-2 /lpf   EKG (NOW)   Result Value Ref Range    Report       [facility] Emergency Dept.    Test Date:  2024  Pt Name:    TYREE MEDELLIN                Department: ER  MRN:        [id number]                      Room:        13  Gender:     Male                         Technician: [id number]  :        1955                   Requested By:TAWNY PACHECO  Order #:    [id number]                    Reading MD:    Measurements  Intervals                                Axis  Rate:       87                           P:          47  KY:         171                          QRS:        11  QRSD:       89                           T:          34  QT:         360  QTc:        433    Interpretive Statements  Sinus rhythm  Baseline wander in lead(s) V6  No previous ECG available for comparison         RADIOLOGY  I have independently interpreted the diagnostic imaging associated with this visit and am waiting the final reading from the radiologist.   My preliminary interpretation is as follows:   -   Radiologist interpretation:   CT-CSPINE WITHOUT PLUS RECONS   Final Result      1.  Negative for cervical spine fracture or malalignment      2.  Incomplete fusion of the C1 posterior ring, anatomic variant      CT-MAXILLOFACIAL W/O PLUS RECONS   Final Result      1.  Negative for facial fracture      2.  Left frontal scalp hematoma      CT-HEAD W/O   Final Result      1.  No acute intracranial abnormality      2.  Left frontal scalp hematoma                 MEDICAL DECISION MAKING      ED COURSE AND PLAN    Tyree Medellin is a 68 y.o. male presenting to the emergency department for evaluation of a fall and seizure-like activity.  Patient was recently seen by neurology in the emergency department in [place] for an episode of transient global ischemia underwent extensive workup including CT MRI and EEG of the brain all of which reportedly were negative.    Today's event may have actually been a syncopal episode as the tremor-like activity did not sound necessarily like a generalized tonic clonic seizure.  I obtained a CT of the head, face, cervical spine which were unremarkable.  I closed the laceration to his forehead.  EKG shows no evidence of dysrhythmia or ischemic changes.  Labs are remarkable for a borderline elevation of troponin.  Given troponin elevation and possible syncopal episode I feel like it is prudent to obtain an echocardiogram and keep patient on telemetry for 24 hours in the hospital.  Discussed with [facility] resident for admission.    ---Pertinent ED Course---:    5:42 PM I reviewed the patient's old records in Epic, medication list, allergies, past medical history and performed a physical examination.         Procedures:    Laceration Repair Procedure Note    Indication: Laceration    Procedure: Laceration was copiously irrigated in the usual fashion.  No foreign bodies were identified.  Wound margins were anesthetized with lidocaine with epinephrine.  Closed laceration with 3 sutures using simple interrupted technique, 5-0 Ethilon.      Total repaired wound length: 2 cm.     Other Items: None    The patient tolerated the procedure well.    Complications: None     ----------------------------------------------------------------------------------  DISCUSSIONS    I have discussed management of the patient with the following physicians and MIKAL's: [facility] resident    Discussion of management with other Q or appropriate source(s):     Escalation of care considered, and ultimately not performed: Considered but no indication for cardiology consultation at this time.    Barriers to care at this time, including but not limited to:     Decision tools and prescription drugs considered including, but not limited to:     FINAL IMPRESSION    1. Syncope, unspecified syncope type        Current Discharge Medication List            DISPOSITION    Admission: The  patient will be admitted for further evaluation and treatment. Discussed case with consultants and relayed all necessary information.        This chart was dictated using an electronic voice recognition software. The chart has been reviewed and edited but there is still possibility for dictation errors due to limitation of software.    Hi Bee,  5/3/2024

## 2024-05-04 NOTE — PROGRESS NOTES
Report received from ANA Grimaldo.  Patient arrived to unit. All belongings gathered.  Assessment complete.  A&Ox4. Denies CP/SOB.  Denies pain at this time.   Skin per flowsheet.  No bleeding noted from suture site to L forehead.  Pt ambulates independently.  All needs met at this time. Call light within reach. Pt calls appropriately.   Bed low and locked, non skid socks in place. Hourly rounding in place.

## 2024-05-04 NOTE — DISCHARGE SUMMARY
"Discharge Summary    CHIEF COMPLAINT ON ADMISSION  Chief Complaint   Patient presents with    Seizure     BIB EMS from [facility]. Pt had a witness seizure lasting approx 1 min. Pt arrived with hematoma to L eye and lac to L forehead. Dressing in place and bleeding controlled.    No prior sz hx. -loc -thinners        Reason for Admission  Syncope vs possible seizures     Admission Date  5/3/2024    CODE STATUS  Prior    HPI & HOSPITAL COURSE  Per Dr. Saavedra's HPI dated 5/3/2024:  \"Tyree Medellin is a 68 y.o. male w/PMH of Hypothyroidism, PATRICIA, Hypertriglyceridemia, Anxiety/Depression who presents 5/3/2024 with loss of consciousness.  Refers he drove from [facility] to [place] yesterday (05/02) with his brother. Staying at the [facility] and that last thing he recalls prior to waking up in the ambulance was getting up from a chair in the hotel room.  His brother told him that he suddenly lost consciousness and fell forward hitting his head with the edge of the bed.  His brother has not mentioned loss of urine or stool but unclear if he had any involuntary movements (ERP refers that brother did mention apparent involuntary movements).  Denies prior episodes of syncope but does refer that recently (early April) was seen by neurologist in [place] due to transient global amnesia and that he has had extensive workup without any specific etiology is identified, this includes an EEG that he refers was done within the last 48 to 72 hours and that did not reveal any abnormalities.  Denies feeling palpitations, dizziness, having blurry vision, shortness of breath, diaphoresis, recent diarrhea or emesis, or any other specific symptoms prior to syncopal episode.     ED evaluation and workup:   - Comfortable, no acute distress.   - Vitals stable, on room air.   - EKG w/o arrhythmias, blocks or ischemia/MI changes.   - No electrolyte abnormalities. Mild elevation in Troponin.   - CT head w/o intracranial abnormalities. " "  - Laceration of forehead sutured in ED.      Patient will be admitted for observation d/t Syncope.\"    Hospital course under my care: afebrile and vitals wnl. Orthostatic neg. Exam at bedside was grossly unremarkable except ecchymosis in left temporal and piyush-orbital area. Labs and imagings findings discussed with patient. STEPHEN matos. Echo reviewed: normal LVEF, no wall motion abnormalities and no significant valvular diseases. EEG done showed presence of rare to occasional, left temporal, sharp discharges, is suggestive of increased propensity toward focal seizures arising from that region. At this point, neurology consulted - recommended Keppra loading dose with Keppra 500mg bid for 5 days and 750mg twice daily until follow up with neurology in [place].  No driving, operating heavy machinery, swimming alone, climbing ladders, or engaging in any activity that could increase the risk of injury or death in the event of a seizure.     Therefore, he is discharged in fair and stable condition to home with close outpatient follow-up.    The patient recovered much more quickly than anticipated on admission.    Discharge Date  05/04/24    FOLLOW UP ITEMS POST DISCHARGE  Outpatient MRI Brain with contrast     DISCHARGE DIAGNOSES  Principal Problem:    Syncope and collapse (POA: Yes)  Active Problems:    Laceration of head (POA: Unknown)    Hypothyroidism (POA: Unknown)    Hypertriglyceridemia (POA: Unknown)    PATRICIA (obstructive sleep apnea) (POA: Yes)      Generalized anxiety disorder (POA: Yes)    Elevated troponin (POA: Unknown)  Resolved Problems:    * No resolved hospital problems. *      FOLLOW UP  Please follow up with your primary care physician within 1 to 2 weeks of discharge. Tele health if available.    MEDICATIONS ON DISCHARGE     Medication List        START taking these medications        Instructions   levETIRAcetam 500 MG Tabs  Start taking on: May 4, 2024  Commonly known as: Keppra   Take 1 Tablet " by mouth 2 times a day for 5 days, THEN 1.5 Tablets 2 times a day for 30 days.            CONTINUE taking these medications        Instructions   asa/apap/caffeine 250-250-65 MG Tabs  Commonly known as: Excedrin   Take 1 Tablet by mouth 1 time a day as needed for Headache.  Dose: 1 Tablet     aspirin 81 MG EC tablet   Take 81 mg by mouth every day. (DISCONTINUED)  Dose: 81 mg     atorvastatin 20 MG Tabs  Commonly known as: Lipitor   Take 20 mg by mouth every day.  Dose: 20 mg     cetirizine 10 MG Tabs  Commonly known as: ZyrTEC   Take 10 mg by mouth every day.  Dose: 10 mg     ibuprofen 200 MG Tabs  Commonly known as: Motrin   Take 400 mg by mouth 1 time a day as needed for Headache. 2 tablets = 400 mg.  Dose: 400 mg     levothyroxine 100 MCG Tabs  Commonly known as: Synthroid   Take 100 mcg by mouth every morning on an empty stomach.  Dose: 100 mcg     multivitamin Tabs   Take 1 Tablet by mouth every day.  Dose: 1 Tablet     Paxil 40 MG tablet  Generic drug: paroxetine   Take 20 mg by mouth every day. 1/2 tablet = 20 mg.  Dose: 20 mg     VITAMIN B-12 PO   Take 1 Tablet by mouth every day.  Dose: 1 Tablet     Vitamin C 1000 MG Tabs   Take 1,000 mg by mouth every day.  Dose: 1,000 mg     VITAMIN D3 PO   Take 1 Tablet by mouth every day.  Dose: 1 Tablet              Allergies  No Known Allergies    DIET  Cardiac       ACTIVITY  As tolerated.  Weight bearing as tolerated    CONSULTATIONS  Neurology    PROCEDURES  EEG    LABORATORY  Lab Results   Component Value Date    SODIUM 133 (L) 05/04/2024    POTASSIUM 3.3 (L) 05/04/2024    CHLORIDE 98 05/04/2024    CO2 24 05/04/2024    GLUCOSE 146 (H) 05/04/2024    BUN 14 05/04/2024    CREATININE 0.73 05/04/2024        Lab Results   Component Value Date    WBC 5.8 05/03/2024    HEMOGLOBIN 13.8 (L) 05/03/2024    HEMATOCRIT 39.1 (L) 05/03/2024    PLATELETCT 196 05/03/2024        Total time of the discharge process exceeds 35 minutes.

## 2024-05-04 NOTE — PROGRESS NOTES
Received report from Vicki PEREZ. Pt alert and oriented x4. Call light in reach. Bed in lowest position. Care of plan discussed with pt with pt agreeing to care of plan. Communication board updated. All questions answered.

## 2024-05-04 NOTE — ASSESSMENT & PLAN NOTE
Left fronto parietal area of forehead d/t fall with trauma at edge of bed.   Sutured in ED by ERP.

## 2024-05-04 NOTE — CARE PLAN
The patient is Stable - Low risk of patient condition declining or worsening    Shift Goals  Clinical Goals: Neuro checks, tele monitor  Patient Goals: rest  Family Goals: updates    Progress made toward(s) clinical / shift goals:  Neuro intact, no changes. Patient has steady gait, AOx4.     Problem: Knowledge Deficit - Standard  Goal: Patient and family/care givers will demonstrate understanding of plan of care, disease process/condition, diagnostic tests and medications  Outcome: Progressing     Problem: Pain - Standard  Goal: Alleviation of pain or a reduction in pain to the patient’s comfort goal  Outcome: Progressing

## 2024-05-04 NOTE — ED NOTES
Bedside report received from off going RN: Windy, assumed care of patient.  POC discussed with patient. Call light within reach, all needs addressed at this time.       Fall risk interventions in place: Move the patient closer to the nurse's station, Place socks on patient, Place fall risk sign on patient's door, and Keep floor surfaces clean and dry (all applicable per [place] Fall risk assessment)   Continuous monitoring: Cardiac Leads, Pulse Ox, or Blood Pressure  IVF/IV medications: Not Applicable   Oxygen: Room Air  Bedside sitter: Not Applicable   Isolation: Not Applicable

## 2024-05-04 NOTE — ED NOTES
"Pt states \" I had TGA ( transient global amnesia) recently and got a brain scan and it was clear\"   "

## 2024-05-04 NOTE — PROGRESS NOTES
Bedside report received at 0720. Pt denies needs at this time. Call light within reach. Pending Orthos and EEG.

## 2024-05-04 NOTE — PROGRESS NOTES
"Medical Student PROGRESS NOTE    CC: LOC and seizure-like activity    Consults:  None    OVERVIEW  Hospital Course:  Tyree Medellin is a 68 year old male with a PMH of PATRICIA and hypothyroidism admitted on 5/3/2024 for loss of consciousness and seizure-like activity. Patient drove to [place] from [place] on 5/2/2024 with his brother, and did not bring home CPAP with him on this trip. On 5/3/2024, patient reportedly stood up from sitting in the hotel room, looked to his side and was tracking and pointing at an object, and then proceeded to fall forward and hit the left frontal scalp on the corner of the hotel bed. Brother reports he had \"jerking\" movements in his shoulders after LOC. Brother called EMS, and patient awoke to EMS. He denied urinary/fecal incontinence or post-ictal state. Patient reports he had stood up several times that day already, may have been dehydrated from the drive up, may have had low oxygen due to not bringing his CPAP with him, and had eaten a donut earlier the day he lost consciousness. CT of the head, CT maxillofacial, and CT cervical spine showed left frontal scalp hematoma but did not show any fractures, dislocations, or intracranial processes. Labs 5/4 show hyponatremia at 133 and hypokalemia at 3.3. Troponins in the ED were elevated at 52, 86, and have since decreased to 34 on 5/4. EKG shows sinus rhythm and no acute ST elevations/depressions or evidence of ACS. 1-hour EEG results showed the presence of rare to occasional, left temporal, sharp discharges, is suggestive of increased propensity toward focal seizures arising from that region. Echocardiogram results done 5/4 were normal.     SUBJECTIVE  Tyree Medellin is a 68 year old male with a PMH of PATRICIA and hypothyroidism admitted on 5/3/2024 for loss of consciousness and seizure-like activity. History was obtained from patient and his wife, Candelaria. Patient drove to [place] from [place] on 5/2/2024 with his brother, and did not bring home CPAP " "with him on this trip. On 5/3/2024, patient reportedly stood up from sitting in the hotel room, looked to his side and was tracking and pointing at an object, and then proceeded to fall forward and hit the left frontal scalp on the corner of the hotel bed. Brother reports he had \"jerking\" movements in his shoulders after LOC. Brother called EMS, and patient awoke to EMS. He denied urinary/fecal incontinence or post-ictal state. Patient reports he had stood up several times that day already, may have been dehydrated from the drive up, may have had low oxygen due to not bringing his CPAP with him, and had eaten a donut earlier the day he lost consciousness.  Patient had an episode of transient global amnesia about 6 weeks ago in [place]. His wife reports that he was in the middle of a zoom call, when all of a sudden EMS arrived due to calls of him not responding on the zoom call. This episode lasted 3-4 hours, and workup did not show etiology for the episode. Documents from [place] in [place] have been sent to [facility].     ROS: Review of Systems   Constitutional:  Negative for chills and fever.   Respiratory:  Negative for shortness of breath.    Cardiovascular:  Negative for chest pain and palpitations.   Gastrointestinal:  Negative for abdominal pain, constipation, diarrhea, nausea and vomiting.   Genitourinary:  Negative for dysuria and hematuria.   Neurological:  Positive for seizures, loss of consciousness and headaches. Negative for dizziness, tremors, sensory change, speech change and focal weakness.        OBJECTIVE  Vital Signs: Last Filed Vitals Signs: 24 Hour Range  /81   Pulse 80   Temp 37.3 °C (99.2 °F) (Temporal)   Resp 16   Ht 1.778 m (5' 10\")   Wt 81.7 kg (180 lb 1.9 oz)   SpO2 95%     Physical Examination:  General: A&Ox3, NAD, non-toxic  HEENT: PERRL, no conjunctival injection, OP clear, +ecchymoses over left frontal scalp and left orbital region, +erythema/ecchymoses on lateal tongue " BL  Neck: No Cervical LAD, No JVD, trachea is midline  Cardiovascular: RRR, no murmurs, no chest wall tenderness  Respiratory: CTAB, no wheezes, no accessory muscle use  Gastrointestinal: Soft, NT, ND, BS+, no hepatomegaly  Extremities:  no BELÉN, no joint deformities in RLE or LLE  Skin: No rashes in visible areas, warm and dry  Psychiatric: Mood/affect non-depressed, no delirium  Neurologic: Cranial nerves are intact, sensation is symmetrical bilaterally, motor intact, no focal neurological deficits    Laboratory Studies:  Recent Labs     05/03/24 1748   WBC 5.8   RBC 4.50*   HEMOGLOBIN 13.8*   HEMATOCRIT 39.1*   MCV 86.9   MCH 30.7   RDW 39.5   PLATELETCT 196   MPV 9.7   NEUTSPOLYS 73.80*   LYMPHOCYTES 15.90*   MONOCYTES 8.30   EOSINOPHILS 1.20   BASOPHILS 0.30     Recent Labs     05/03/24 1748 05/04/24  0047   SODIUM 136 133*   POTASSIUM 3.7 3.3*   CHLORIDE 100 98   CO2 23 24   GLUCOSE 108* 146*   BUN 16 14   CREATININE 0.79 0.73     EC-ECHOCARDIOGRAM COMPLETE W/O CONT   Final Result      CT-CSPINE WITHOUT PLUS RECONS   Final Result      1.  Negative for cervical spine fracture or malalignment      2.  Incomplete fusion of the C1 posterior ring, anatomic variant      CT-MAXILLOFACIAL W/O PLUS RECONS   Final Result      1.  Negative for facial fracture      2.  Left frontal scalp hematoma      CT-HEAD W/O   Final Result      1.  No acute intracranial abnormality      2.  Left frontal scalp hematoma              Medications:  Medications were reviewed and updated     Assessment and Plan:  Tyree Medellin is a 68 y.o. male w/ h/o PATRICIA and hypothyroidism admitted on 5/3/2024 for loss of consciousness and seizure-like activity.     Patient Active Hospital Problem List:       Syncope and collapse (5/3/2024)           Assessment: On 5/3/2024, patient reportedly stood up from sitting in the hotel room, looked to his side and was tracking and pointing at an object, and then proceeded to fall forward and hit the left frontal  "scalp on the corner of the hotel bed. Brother reports he had \"jerking\" movements in his shoulders after LOC. Brother called EMS, and patient awoke to EMS. He denied urinary/fecal incontinence or post-ictal state. CT of the head, CT maxillofacial, and CT cervical spine showed left frontal scalp hematoma but did not show any fractures, dislocations, or intracranial processes. Workup from episode of transient global amnesia did not show evidence of etiology for the episode. 1-hour EEG results showed the presence of rare to occasional, left temporal, sharp discharges, is suggestive of increased propensity toward focal seizures arising from that region.        Plan: -Neurology consult for recommnedations  -Echocardiogram results normal  -Neuro checks q 4  -Telemetry and vitals monitoring  -Maintain volume status and monitor electrolytes, replete PRN   -CPAP for PATRICIA  -Follow-up outpatient with cardiology in [place] for Zio patch         Laceration of head (5/3/2024)           Assessment: On 5/3/2024, patient reportedly stood up from sitting in the hotel room, looked to his side and was tracking and pointing at an object, and then proceeded to fall forward and hit the left frontal scalp on the corner of the hotel bed. CT of the head, CT maxillofacial, and CT cervical spine showed left frontal scalp hematoma but did not show any fractures, dislocations, or intracranial processes.        Plan: -Follow-up outpatient in 5-6 days for suture removal       Hypokalemia (5/4/2024)           Assessment: CMP from 5/4/24 shows hypokalemia at 3.3.        Plan: -Replete and CTM         Hypothyroidism (5/3/2024)           Assessment: Present on admission. Patient has history of hypothyroidism treated with synthroid. TSH panel 5/3 does not show abnormalities.        Plan: -Continue home synthroid         Hypertriglyceridemia (5/3/2024)           Assessment: Present on admission.         Plan: Continue home atorvastatin         PATRICIA (obstructive " sleep apnea) (12/31/2009)           Assessment: Present on admission. Patient uses a home CPAP but did not bring it on his trip to [place].        Plan: -CPAP and maintain O2 levels         Generalized anxiety disorder (1/12/2010)           Assessment: Present on admission. Patient takes home paroxetine.        Plan: -Continue home paroxetine.         Elevated troponin (5/4/2024)           Assessment: Troponins in the ED were elevated at 52, 86, and have since decreased to 34 on 5/4. EKG shows sinus rhythm and no acute ST elevations/depressions or evidence of ACS. Echocardiogram pending 5/4/2024.        Plan: -Echocardiogram results normal.           Patient was discussed with bedside RN/SW    High acuity, moderate risk, complex medical decision making    Diet: Regular  Fluids:  mL/hr  PPx: SCD/TEDs  Bowel: Regimen  PTOT: None  Lines/Gonzalez: Peripheral IV    Code: Full    Dispo: Discharge.       Beatris Napier, Student Medical Student III

## 2024-05-04 NOTE — PROGRESS NOTES
Monitor Summary:  Rhythm: SB/SR   Rate: 52-72  Ectopy: Rare PVC    0.17/0.08/0.34    Per monitor room interpretation.            no chest pain, no cough, and no shortness of breath.

## 2024-05-04 NOTE — H&P
[facility] History & Physical Note    Date of Service  5/4/2024    Attending: Priya Knott M.d.  Senior Resident: Frantz Ng  Contact Number: [phone]    Primary Care Physician  Pcp Pt States None    Code Status  Full Code    Chief Complaint  Chief Complaint   Patient presents with    Seizure     BIB EMS from [facility]. Pt had a witness seizure lasting approx 1 min. Pt arrived with hematoma to L eye and lac to L forehead. Dressing in place and bleeding controlled.    No prior sz hx. -loc -thinners        History of Presenting Illness (HPI):   Tyree Medellin is a 68 y.o. male w/PMH of Hypothyroidism, PATRICIA, Hypertriglyceridemia, Anxiety/Depression who presents 5/3/2024 with loss of consciousness.  Refers he drove from [place] [facility] to [place] yesterday (05/02) with his brother. Staying at the [facility] and that last thing he recalls prior to waking up in the ambulance was getting up from a chair in the hotel room.  His brother told him that he suddenly lost consciousness and fell forward hitting his head with the edge of the bed.  His brother has not mentioned loss of urine or stool but unclear if he had any involuntary movements (ERP refers that brother did mention apparent involuntary movements).  Denies prior episodes of syncope but does refer that recently (early April) was seen by neurologist in [place] due to transient global amnesia and that he has had extensive workup without any specific etiology is identified, this includes an EEG that he refers was done within the last 48 to 72 hours and that did not reveal any abnormalities.  Denies feeling palpitations, dizziness, having blurry vision, shortness of breath, diaphoresis, recent diarrhea or emesis, or any other specific symptoms prior to syncopal episode.    ED evaluation and workup:   - Comfortable, no acute distress.   - Vitals stable, on room air.   - EKG w/o arrhythmias, blocks or ischemia/MI changes.   - No  electrolyte abnormalities. Mild elevation in Troponin.   - CT head w/o intracranial abnormalities.   - Laceration of forehead sutured in ED.     Patient will be admitted for observation d/t Syncope.     I discussed the plan of care with patient and Dr. Knott .    Review of Systems  Review of Systems   Constitutional:  Negative for diaphoresis, fever, malaise/fatigue and weight loss.   HENT:  Negative for hearing loss and tinnitus.    Eyes:  Negative for blurred vision and double vision.   Respiratory:  Negative for shortness of breath.    Cardiovascular:  Negative for chest pain and palpitations.   Gastrointestinal:  Negative for blood in stool, diarrhea, melena and nausea.   Genitourinary:  Negative for dysuria, frequency and hematuria.   Musculoskeletal:  Positive for falls. Negative for myalgias.   Neurological:  Positive for loss of consciousness and headaches (d/t trauma). Negative for dizziness, tingling, tremors, sensory change, speech change, focal weakness, seizures and weakness.   Psychiatric/Behavioral:  Negative for depression. The patient is not nervous/anxious.        Past Medical History   has no past medical history on file.    Surgical History   has no past surgical history on file.     Family History  family history is not on file.   Family history reviewed with patient.     Social History  Tobacco: Denies  Alcohol: Denies  Recreational drugs (illegal or prescription): Denies  Employment: Denies  Living Situation: Home, wife.  Recent Travel: [facility] to [place] (05/02)  Primary Care Provider: Not Reviewed  Other (stressors, spirituality, exposures): None    Allergies  Not on File    Medications  Prior to Admission Medications   Prescriptions Last Dose Informant Patient Reported? Taking?   Ascorbic Acid (VITAMIN C) 1000 MG Tab 5/3/2024 at AM Patient Yes Yes   Sig: Take 1,000 mg by mouth every day.   Cholecalciferol (VITAMIN D3 PO) 5/3/2024 at AM Patient Yes Yes   Sig: Take 1 Tablet by mouth  every day.   Cyanocobalamin (VITAMIN B-12 PO) 5/3/2024 at AM Patient Yes Yes   Sig: Take 1 Tablet by mouth every day.   asa/apap/caffeine (EXCEDRIN) 250-250-65 MG Tab 5/2/2024 at PRN Patient Yes Yes   Sig: Take 1 Tablet by mouth 1 time a day as needed for Headache.   aspirin 81 MG EC tablet ABOUT 3 WEEKS AGO at DISCONTINUED Patient Yes Yes   Sig: Take 81 mg by mouth every day. (DISCONTINUED)   atorvastatin (LIPITOR) 20 MG Tab 5/3/2024 at AM Patient, Other Facility Yes Yes   Sig: Take 20 mg by mouth every day.   cetirizine (ZYRTEC) 10 MG Tab 5/3/2024 at AM Patient Yes Yes   Sig: Take 10 mg by mouth every day.   ibuprofen (MOTRIN) 200 MG Tab 5/3/2024 at AM Patient Yes Yes   Sig: Take 400 mg by mouth 1 time a day as needed for Headache. 2 tablets = 400 mg.   levothyroxine (SYNTHROID) 100 MCG Tab 5/3/2024 at AM Patient, Other Facility Yes Yes   Sig: Take 100 mcg by mouth every morning on an empty stomach.   multivitamin Tab 5/3/2024 at AM Patient Yes Yes   Sig: Take 1 Tablet by mouth every day.   paroxetine (PAXIL) 40 MG tablet 5/3/2024 at AM Patient, Other Facility Yes Yes   Sig: Take 20 mg by mouth every day. 1/2 tablet = 20 mg.      Facility-Administered Medications: None       Physical Exam  Temp:  [36.2 °C (97.1 °F)-37.1 °C (98.8 °F)] 36.7 °C (98.1 °F)  Pulse:  [61-92] 61  Resp:  [14-20] 20  BP: (143-182)/(81-85) 151/82  SpO2:  [91 %-96 %] 95 %  Blood Pressure : (!) 143/82   Temperature: 37.1 °C (98.8 °F)   Pulse: 88   Respiration: 20   Pulse Oximetry: 91 %       Physical Exam  Constitutional:       General: He is not in acute distress.     Appearance: He is not ill-appearing.   HENT:      Head:      Comments: Frontotemporal laceration     Nose: Nose normal.      Mouth/Throat:      Mouth: Mucous membranes are moist.   Eyes:      Extraocular Movements: Extraocular movements intact.      Conjunctiva/sclera: Conjunctivae normal.      Pupils: Pupils are equal, round, and reactive to light.   Cardiovascular:      Rate  "and Rhythm: Normal rate and regular rhythm.      Pulses: Normal pulses.   Pulmonary:      Effort: Pulmonary effort is normal. No respiratory distress.      Breath sounds: Normal breath sounds.      Comments: SpO2 > 95% on room air.   Chest:      Chest wall: No tenderness.   Abdominal:      General: Abdomen is flat. Bowel sounds are normal. There is no distension.      Palpations: Abdomen is soft.      Tenderness: There is no abdominal tenderness.   Musculoskeletal:      Cervical back: No rigidity or tenderness.      Right lower leg: No edema.      Left lower leg: No edema.   Skin:     Coloration: Skin is not pale.      Findings: Bruising present.      Comments: Frontrotemporal laceration.    Neurological:      Comments: Alert and oriented to person, time, and place  Follows commands  Speech is fluent  Pupils reactive to light and accomodation  Occular movements preserved  Facial symmetry preserved  Tongue is midline  Able to tolerate antigravity bilaterally, upper and lower extremities  No pronator drift  Sensation preserved throughout   Coordination preserved  DTR preserved, 2+ throughout  Babinski negative         Laboratory:  Recent Labs     05/03/24  1748   WBC 5.8   RBC 4.50*   HEMOGLOBIN 13.8*   HEMATOCRIT 39.1*   MCV 86.9   MCH 30.7   MCHC 35.3   RDW 39.5   PLATELETCT 196   MPV 9.7     Recent Labs     05/03/24  1748 05/04/24  0047   SODIUM 136 133*   POTASSIUM 3.7 3.3*   CHLORIDE 100 98   CO2 23 24   GLUCOSE 108* 146*   BUN 16 14   CREATININE 0.79 0.73   CALCIUM 8.9 8.8     Recent Labs     05/03/24  1748 05/04/24  0047   ALTSGPT 25  --    ASTSGOT 31  --    ALKPHOSPHAT 70  --    TBILIRUBIN 0.5  --    GLUCOSE 108* 146*         No results for input(s): \"NTPROBNP\" in the last 72 hours.      Recent Labs     05/03/24  1748   TROPONINT 34*       Imaging:  CT-CSPINE WITHOUT PLUS RECONS   Final Result      1.  Negative for cervical spine fracture or malalignment      2.  Incomplete fusion of the C1 posterior ring, " "anatomic variant      CT-MAXILLOFACIAL W/O PLUS RECONS   Final Result      1.  Negative for facial fracture      2.  Left frontal scalp hematoma      CT-HEAD W/O   Final Result      1.  No acute intracranial abnormality      2.  Left frontal scalp hematoma             EKG:  I have personally reviewed the images and compared with prior images.    Assessment/Plan:  Problem Representation:   68 y.o. male w/PMH of Hypothyroidism, PATRICIA, Hypertriglyceridemia, Anxiety/Depression. Admitted 05/03/24 d/t syncope for observation.     I anticipate this patient is appropriate for observation status at this time because syncope    Patient will need a Telemetry bed on MEDICAL service .  The need is secondary to syncope.    * Syncope and collapse- (present on admission)  Assessment & Plan  Sudden loss consciousness, fell forward hitting his head at the edge of the bed when attempting to get up from a sitting position; no recollection, told by brother.  Woke up in the ambulance; prior recollection is that he had told his brother \"lets go eat something\" and beginning the motion to get up from a chair. Had 3 energizing drinks in AM (does this every morning).   Denies prior episodes of syncope.  Denies his brother telling him that he lost urine or stool.  Unclear if his brother saw any involuntary movements, per ERP reportedly his brother did see this.    Refers recently being seen for transient global amnesia (April); he had extensive workup w/o any specific etiologies identified, EEG that was done within the last 48 hours was normal per patient.   CT head: No intracranial abnormalities, evident left frontoparietal subcutaneous hematoma.  Maxillofacial and cervical spine CT without acute findings concerning for bone fractures.  TSH: 0.520.  No electrolyte abnormalities on CMP.  Mildly elevated troponin T.  EKG: Sinus rhythm, no axis deviation, no evident abnormalities in conduction, no abnormalities consistent with " ischemia/MI.  Unclear etiology at this time. Possibly hypoxia d/t not using CPAP overnight and being at altitude (leaves in [facility]) +/- Arrhythmia +/- Seizure (no prior history).   - Obtain records from [place] regarding recent workup for transient global amnesia.   - cEEG.   - Telemetry monitoring.   - Maintain euvolemia.   - O2 support w/CPAP overnight.   - Precautions: seizures, falls, aspiration.  - Consider consultation to Neurology if pertinent.     Elevated troponin  Assessment & Plan  Troponin: 34.   EKG w/o changes suggestive of ischemia.   No ACS type symptoms.   - Repeat troponin.     Generalized anxiety disorder- (present on admission)  Assessment & Plan  - Continue home Paroxetine.     PATRICIA (obstructive sleep apnea)- (present on admission)  Assessment & Plan  Did not use CPAP overnight, left at home in [facility].   - Order for CPAP.     Hypertriglyceridemia  Assessment & Plan  Reported by patient.   Outpatient: Atorvastatin 20 mg daily.   - Continue home atorvastatin.     Hypothyroidism  Assessment & Plan  TSH: 0.520  Outpatient: Levothyroxine 100 mcg  - Continue Levothyroxine.     Laceration of head  Assessment & Plan  Left fronto parietal area of forehead d/t fall with trauma at edge of bed.   Sutured in ED by ERP.         VTE prophylaxis: SCDs/TEDs

## 2024-05-04 NOTE — ED TRIAGE NOTES
"Chief Complaint   Patient presents with    Seizure     BIB EMS from [facility]. Pt had a witness seizure lasting approx 1 min. Pt arrived with hematoma to L eye and lac to L forehead. Dressing in place and bleeding controlled.    No prior sz hx. -loc -thinners      BP (!) 182/81   Pulse 92   Temp 36.2 °C (97.1 °F) (Temporal)   Resp 15   Ht 1.778 m (5' 10\")   Wt 79.4 kg (175 lb)   SpO2 92%   BMI 25.11 kg/m²       Pt arrives in C-collar placed by EMS due to mechanism of injury   "